# Patient Record
Sex: FEMALE | Race: WHITE | Employment: FULL TIME | URBAN - METROPOLITAN AREA
[De-identification: names, ages, dates, MRNs, and addresses within clinical notes are randomized per-mention and may not be internally consistent; named-entity substitution may affect disease eponyms.]

---

## 2017-07-03 ENCOUNTER — HOSPITAL ENCOUNTER (OUTPATIENT)
Dept: PHYSICAL THERAPY | Age: 62
Discharge: HOME | End: 2017-07-03
Payer: COMMERCIAL

## 2017-07-03 PROCEDURE — 97140 MANUAL THERAPY 1/> REGIONS: CPT

## 2017-07-03 PROCEDURE — 97162 PT EVAL MOD COMPLEX 30 MIN: CPT

## 2017-07-03 PROCEDURE — 97110 THERAPEUTIC EXERCISES: CPT

## 2017-07-03 NOTE — PROGRESS NOTES
[facility]  [address]  [place], [address]  [phone] fax    Plan of Care/ Statement of Necessity for Physical Therapy Services    Patient name: Julissa rOtiz Start of Care: 7/3/2017   Referral source: Umm Flanagan MD : 1955    Medical Diagnosis: Low back pain [M54.5]   Onset Date: Chronic (over 1 year)    Treatment Diagnosis: LBP   Prior Hospitalization: see medical history Provider#: [id number]   Medications: Verified on Patient summary List    Comorbidities: HTN, Arthritis    Prior Level of Function: The patient states improved ease of ambulation and sitting prior to onset. The Plan of Care and following information is based on the information from the initial evaluation. Assessment/ key information: The patient is a 60year old female with a chief complaint of LBP that limits her sitting and standing tolerance (currently 20 minutes). She states that she has had this pain for over a year but it has gradually gotten worse. She denies recent imaging. The patient has signs and symptoms consistent with SIJ involvement with associated LBP. She has impairments associated with the impairments consisting of pain, decreased ROM, decreased flexibility, limited sitting and standing tolerance, and decreased ADL efficiency. The patient will benefit from skilled PT in order to address the above impairments. Evaluation Complexity History MEDIUM  Complexity : 1-2 comorbidities / personal factors will impact the outcome/ POC ; Examination LOW Complexity : 1-2 Standardized tests and measures addressing body structure, function, activity limitation and / or participation in recreation  ;Presentation MEDIUM Complexity : Evolving with changing characteristics  ; Clinical Decision Making MEDIUM Complexity : FOTO score of 26-74  Overall Complexity Rating: MEDIUM  Problem List: pain affecting function, decrease ROM, decrease strength, decrease ADL/ functional abilitiies, decrease activity tolerance and decrease flexibility/ joint mobility   Treatment Plan may include any combination of the following: Therapeutic exercise, Therapeutic activities, Neuromuscular re-education, Physical agent/modality, Manual therapy and Patient education  Patient / Family readiness to learn indicated by: asking questions, trying to perform skills and interest  Persons(s) to be included in education: patient (P)  Barriers to Learning/Limitations: None  Patient Goal (s): Relief of some symptoms and possibly do some dry needling  Patient Self Reported Health Status: good  Rehabilitation Potential: good    Short Term Goals: To be accomplished in 2 weeks:   1. The patient will be independent and compliant with HEP to maximize therapeutic benefit. 2. The patient will improve negative RYAN to maximize ease of ADLs. Long Term Goals: To be accomplished in 4 weeks:   1. The patient will improve FOTO score to 63 to maximize quality of life. 2. The patient will improve hip ABD/EXT to 4/5 MMT to maximize stability during ADLs. 3. The patient will report being able to ambulate and sit for 30 minutes prior to pain increase for improved ADL ease. 4. The patient will improve display negative Marc Espinoza test to reduce stress on lumbar spine during ADLs. Frequency / Duration: Patient to be seen 2 times per  week for 4 weeks. Patient/ Caregiver education and instruction: Diagnosis, prognosis, self care, activity modification and exercises   [x]  Plan of care has been reviewed with SANDOVAL Kumar, PT 7/3/2017 5:56 PM    ________________________________________________________________________    I certify that the above Therapy Services are being furnished while the patient is under my care. I agree with the treatment plan and certify that this therapy is necessary.     [de-identified] Signature:____________________  Date:____________Time: _________    Please sign and return to [facility]  [address]  [place], [address]  [phone] fax

## 2017-07-03 NOTE — PROGRESS NOTES
PT DAILY TREATMENT NOTE     Patient Name: Eugene Grigsby  Date:7/3/2017  : 1955  [x]  Patient  Verified  Payor: Skip Sims / [facility] [place] [employer] / Product Type: Commerical /    In time:5:04  Out time:6:00  Total Treatment Time (min): 56  Visit #: 1 of 8    Treatment Area: Low back pain [M54.5]    SUBJECTIVE  Pain Level (0-10 scale): 3/10  Any medication changes, allergies to medications, adverse drug reactions, diagnosis change, or new procedure performed?: [x] No    [] Yes (see summary sheet for update)  Subjective functional status/changes:   [] No changes reported  The patient states that she has a chief complaint of LBP with L sided leg pain. OBJECTIVE  10 min Therapeutic Exercise:  [x] See flow sheet :   Rationale: increase ROM and increase strength to improve the patients ability to improve ADL ease. 8 min Manual Therapy:  L LE tx x 2 with leg pull following verbal permission, shotgun   Rationale: decrease pain, increase ROM and increase tissue extensibility to improve ADL ease. With   [] TE   [] TA   [] neuro   [] other: Patient Education: [x] Review HEP    [] Progressed/Changed HEP based on:   [] positioning   [] body mechanics   [] transfers   [] heat/ice application    [] other:      Other Objective/Functional Measures:   See IE     Pain Level (0-10 scale) post treatment: 3/10    ASSESSMENT/Changes in Function: See POC. Patient will continue to benefit from skilled PT services to modify and progress therapeutic interventions, address functional mobility deficits, address ROM deficits, address strength deficits, analyze and address soft tissue restrictions, analyze and cue movement patterns, analyze and modify body mechanics/ergonomics, assess and modify postural abnormalities and instruct in home and community integration to attain remaining goals.      [x]  See Plan of Care  []  See progress note/recertification  []  See Discharge Summary         Progress towards goals / Updated goals:  Short Term Goals: To be accomplished in 2 weeks:   1. The patient will be independent and compliant with HEP to maximize therapeutic benefit. 2. The patient will improve negative RYAN to maximize ease of ADLs. Long Term Goals: To be accomplished in 4 weeks:   1. The patient will improve FOTO score to 63 to maximize quality of life. 2. The patient will improve hip ABD/EXT to 4/5 MMT to maximize stability during ADLs. 3. The patient will report being able to ambulate and sit for 30 minutes prior to pain increase for improved ADL ease. 4. The patient will improve display negative [place] test to reduce stress on lumbar spine during ADLs.     PLAN  []  Upgrade activities as tolerated     [x]  Continue plan of care  []  Update interventions per flow sheet        []  Discharge due to:_  []  Other:_      Khadra Hopper, PT 7/3/2017  6:37 PM    Future Appointments  Date Time Provider Jen Cruz   7/11/2017 5:00 PM Ana Beams, PT [facility]   7/13/2017 5:30 PM Malissa Klein, PTA [facility]   7/18/2017 4:30 PM Ana Beams, PT [facility]   7/21/2017 4:30 PM Ana Beams, PT [facility]   7/24/2017 5:30 PM Ana Beams, PT [facility]   7/26/2017 4:00 PM Malissa Klein, PTA [facility]   7/31/2017 4:00 PM Ana Beams, PT [facility]

## 2017-07-11 ENCOUNTER — HOSPITAL ENCOUNTER (OUTPATIENT)
Dept: PHYSICAL THERAPY | Age: 62
Discharge: HOME | End: 2017-07-11
Payer: COMMERCIAL

## 2017-07-11 PROCEDURE — 97140 MANUAL THERAPY 1/> REGIONS: CPT

## 2017-07-11 PROCEDURE — 97110 THERAPEUTIC EXERCISES: CPT

## 2017-07-11 PROCEDURE — 97112 NEUROMUSCULAR REEDUCATION: CPT

## 2017-07-11 NOTE — PROGRESS NOTES
PT DAILY TREATMENT NOTE 3-16    Patient Name: Andria Awan  Date:2017  : 1955  [x]  Patient  Verified  Payor: [place] [facility] Product Type: Commer[facility] /    In time:1700  Out time:  Total Treatment Time (min): 52  Visit #: 2 of 8    Treatment Area: Low back pain [M54.5]    SUBJECTIVE  Pain Level (0-10 scale): 5  Any medication changes, allergies to medications, adverse drug reactions, diagnosis change, or new procedure performed?: [x] No    [] Yes (see summary sheet for update)  Subjective functional status/changes:   [] No changes reported  Pt reports continued symptoms along her lower back and into L leg. OBJECTIVE     min []Eval                  []Re-Eval       10 min Therapeutic Exercise:  [x] See flow sheet :   Rationale: increase ROM and increase strength to improve the patients ability to perform ADLs    25 min Neuromuscular Re-education:  [x]  See flow sheet :   Rationale: increase strength, improve coordination and increase proprioception  to improve the patients ability to improve stability     12 min Manual Therapy:  R ant/L post SIJ with MET and shot gun, L leg pull, MFR along L glutes, see below for DN   Rationale: decrease pain, increase ROM, increase tissue extensibility and decrease trigger points to improve stability    Dry Needling Procedure Note    Procedure: An intramuscular manual therapy (dry needling) and a neuro-muscular re-education treatment was done to deactivate myofascial trigger points with a 30 gauge filament needle under aseptic technique.     Indications:  [x] Myofascial pain and dysfunction [] Muscled spasms  [] Myalgia/myositis   [] Muscle cramps  [x] Muscle imbalances  [] Other:    Chart reviewed for the following:  Eunice MENDOZA, PT, have reviewed the medical history, summary list and precautions/contraindications for Andria Awan.  TIME OUT performed immediately prior to start of procedure:  Eunice MENDOZA PT, have performed the following reviews on Andria Awan prior to the start of the session:      [x] Verified patient identification by name and date of birth    [x] Agreement on all muscles being treated was verified   [x] Purpose of dry needling, side effects, possible complications, risks and benefits were explained to the patient   [x] Procedure site(s) verified  [x] Patient was positioned for comfort and draped for privacy  [x] Informed Consent was signed (initial visit) and verified verbally (subsequent visits)  [x] Patient was instructed on the need to report the use of blood thinners and/or immunosuppressant medications  [x] How to respond to possible adverse effects of treatment  [x] Self treatment of post needling soreness: ice, heat (moist heat, heat wraps) and stretching  [x] Opportunity was given to ask any questions, all questions were answered            Time: 7629  Date of procedure: 7/11/2017    Treatment: The following muscles were treated today with intramuscular dry needling  [] Left [] Right Abdominals: Ant Rectus Abdominis  [] Left [] Right External Oblique / Internal Oblique / Transverse Abdominis  [] Left [] Right Thoracic Multifidi / Benton Pancoast  [] Left [] Right Iliocostalis Nancy Elizabeth / Swati Burner  [] Left [] Right Longissimus Nancy Elizabeth / Swati Burner  [] Left [] Right Lumbar Multifidi  [] Left [] Right Serratus Posterior Inferior  [] Left [] Right Quadratus Lumborum  [] Left [] Right Psoas  [] Left [] Right Iliacus  [] Left [] Right Iliopsoas (inguinal)  [] Left [] Right Piriformis  [] Left [] Right Quadratus Femoris  [x] Left [] Right Emilio Messer / Ezequiel Wu / Hina Ferrer  [] Left [] Right Obturator Internus  [] Left [] Right Obturator Externus / Marcellina Soho / Inferior Gemellus    [] Left [] Right Tensor Fasciae Kallie  [] Left [] Right Iliotibial Band  [] Left [] Right Pectineus  [] Left [] Right Sartorius  [] Left [] Right Gracilis  [] Left [] Right Adductor Brevis / Adductor Longus / Adductor Merlinda Baller  [] Left [] Right Rectus Femoris / Vastus Lateralis / Vastus Intermedius / Vastus Medialis Obliquus  [] Left [] Right Tibialis Anterior / Posterior  [] Left [] Right Extensor Digitorum Longus / Brevis  [] Left [] Right Extensor Hallucis Longus / Brevis  [] Left [] Right Hamstrings: Biceps Femoris / Darwin Caro / Semimembranosis  [] Left [] Right Popliteus / Planteris  [] Left [] Right Gastrocnemius Medial / Lateral  [] Left [] Right Soleus  [] Left [] Right Peronei: Geradine Gubler / Shaune Essex / Tertius  [] Left [] Right Flexor Digitorum Longus / Brevis  [] Left [] Right Flexor Hallucis Longus / Brevis  [] Left [] Right Quadratus Plantae  [] Left [] Right Abductor Digiti Minimi  [] Left [] Right Foot Interossei  [] Left [] Right Other:    Patient's response to today's treatment:  [] Latent Twitch Response  [] Muscle relaxation [] Pain Relief  [] Post needling soreness [] without complications  [] Increased Range of Motion  [] Other:     Performed by: Rose Mariano, PT               With   [] TE   [] TA   [] neuro   [] other: Patient Education: [x] Review HEP    [] Progressed/Changed HEP based on:   [] positioning   [] body mechanics   [] transfers   [] heat/ice application    [] other:      Other Objective/Functional Measures:   HEP met     Pain Level (0-10 scale) post treatment: 0    ASSESSMENT/Changes in Function:   Pt reports abolished symptoms after DN to L glutes. Reviewed importance of rib movement and core engagement, with lessening of lumbar lordosis with cueing (she was able to do better with this after instruction). Also reviewed use of lacrosse ball for self MFR.     Patient will continue to benefit from skilled PT services to modify and progress therapeutic interventions, address functional mobility deficits, address ROM deficits, address strength deficits, analyze and address soft tissue restrictions, analyze and cue movement patterns, analyze and modify body mechanics/ergonomics, assess and modify postural abnormalities and instruct in home and community integration to attain remaining goals. []  See Plan of Care  []  See progress note/recertification  []  See Discharge Summary         Progress towards goals / Updated goals:  Short Term Goals: To be accomplished in 2 weeks:                        1. The patient will be independent and compliant with HEP to maximize therapeutic benefit. met 7/11/2017                        2. The patient will improve negative RYAN to maximize ease of ADLs. Long Term Goals: To be accomplished in 4 weeks:                        1. The patient will improve FOTO score to 63 to maximize quality of life. 2. The patient will improve hip ABD/EXT to 4/5 MMT to maximize stability during ADLs. 3. The patient will report being able to ambulate and sit for 30 minutes prior to pain increase for improved ADL ease. 4. The patient will improve display negative Tameka Habermann test to reduce stress on lumbar spine during ADLs.     PLAN  [x]  Upgrade activities as tolerated     [x]  Continue plan of care  [x]  Update interventions per flow sheet       []  Discharge due to:_  []  Other:_      Latha Jerry PT 7/11/2017  5:05 PM    Future Appointments  Date Time Provider Jen Cruz   7/13/2017 5:30 PM Susana Bender, PTA [facility]   7/18/2017 4:30 PM Latha Jerry, PT [facility]   7/21/2017 4:30 PM Latha Jerry, PT [facility]   7/24/2017 5:30 PM Latha Jerry, PT [facility]   7/26/2017 4:00 PM Susana Peak, PTA [facility]   7/31/2017 4:00 PM Latha Jerry, PT [facility]

## 2017-07-13 ENCOUNTER — HOSPITAL ENCOUNTER (OUTPATIENT)
Dept: PHYSICAL THERAPY | Age: 62
Discharge: HOME | End: 2017-07-13
Payer: COMMERCIAL

## 2017-07-13 PROCEDURE — 97140 MANUAL THERAPY 1/> REGIONS: CPT

## 2017-07-13 PROCEDURE — 97110 THERAPEUTIC EXERCISES: CPT

## 2017-07-13 PROCEDURE — 97112 NEUROMUSCULAR REEDUCATION: CPT

## 2017-07-13 NOTE — PROGRESS NOTES
PT DAILY TREATMENT NOTE     Patient Name: Ramond Pallas  Date:2017  : 1955  [x]  Patient  Verified  Payor: [place] / [facility] [place] [employer] / Product Type: Commerical /    In time:5:30  Out time:6:03  Total Treatment Time (min): 33  Visit #: 3 of 8    Treatment Area: Low back pain [M54.5]    SUBJECTIVE  Pain Level (0-10 scale): 4/10  Any medication changes, allergies to medications, adverse drug reactions, diagnosis change, or new procedure performed?: [x] No    [] Yes (see summary sheet for update)  Subjective functional status/changes:   [] No changes reported  Pt reports decreased pain today. Pt reports increased pain the day after her dry needling treatment but today she has decreased pain. Pt reports having a fall at work a year ago. Pt reports falling flat on her back with her (R) leg behind her and subsequently having a (R) ankle sprain. OBJECTIVE    15 min Therapeutic Exercise:  [x] See flow sheet :   Rationale: increase ROM and increase strength to improve the patients ability to tolerate ADLs. 10 min Neuromuscular Re-education:  [x]  See flow sheet :   Rationale: increase strength, improve coordination and increase proprioception  to improve the patients ability to perform functional activities. 8 min Manual Therapy:  TPR to (B) glutes   Rationale: decrease pain, increase ROM and increase tissue extensibility to improve tolerance to ADLs. With   [] TE   [] TA   [] neuro   [] other: Patient Education: [x] Review HEP    [] Progressed/Changed HEP based on:   [] positioning   [] body mechanics   [] transfers   [] heat/ice application    [] other:      Other Objective/Functional Measures: Neutral pelvic alignment. Pain Level (0-10 scale) post treatment: 0/10    ASSESSMENT/Changes in Function: Pt demonstrates fair control with QP exercises requiring tactile cues to correct form and avoid trunk rotation and extension.      Patient will continue to benefit from skilled PT services to modify and progress therapeutic interventions, address functional mobility deficits, address ROM deficits, address strength deficits, analyze and address soft tissue restrictions, analyze and cue movement patterns and analyze and modify body mechanics/ergonomics to attain remaining goals. []  See Plan of Care  []  See progress note/recertification  []  See Discharge Summary         Progress towards goals / Updated goals:  Short Term Goals: To be accomplished in 2 weeks:                        4. The patient will be independent and compliant with HEP to maximize therapeutic benefit. met 7/11/2017                        2. The patient will improve negative RYAN to maximize ease of ADLs. Long Term Goals: To be accomplished in 4 weeks:                        4. The patient will improve FOTO score to 63 to maximize quality of life.                        2. The patient will improve hip ABD/EXT to 4/5 MMT to maximize stability during ADLs.                       5. The patient will report being able to ambulate and sit for 30 minutes prior to pain increase for improved ADL ease.                         4. The patient will improve display negative Cecilia Sarwat test to reduce stress on lumbar spine during ADLs.     PLAN  []  Upgrade activities as tolerated     [x]  Continue plan of care  []  Update interventions per flow sheet       []  Discharge due to:_  []  Other:_      Kourtney Lindo PTA 7/13/2017  5:44 PM    Future Appointments  Date Time Provider Jen Cruz   7/18/2017 4:30 PM [facility]   7/21/2017 4:30 PM [facility] North[facility]   7/24/2017 5:30 PM [facility]   7/26/2017 4:00 PM [facility] Lindo [facility]   7/31/2017 4:00 PM [facility]

## 2017-07-18 ENCOUNTER — HOSPITAL ENCOUNTER (OUTPATIENT)
Dept: PHYSICAL THERAPY | Age: 62
Discharge: HOME | End: 2017-07-18
Payer: COMMERCIAL

## 2017-07-18 PROCEDURE — 97110 THERAPEUTIC EXERCISES: CPT

## 2017-07-18 PROCEDURE — 97112 NEUROMUSCULAR REEDUCATION: CPT

## 2017-07-18 NOTE — PROGRESS NOTES
PT DAILY TREATMENT NOTE 3-16    Patient Name: Ni Montgomery  Date:2017  : 1955  [x]  Patient  Verified  Payor: Barbee Mcardle / [facility] / Product Type: Commerical /    In time:1630  Out time:1708  Total Treatment Time (min): 38  Visit #: 4 of 8    Treatment Area: Low back pain [M54.5]    SUBJECTIVE  Pain Level (0-10 scale): 3  Any medication changes, allergies to medications, adverse drug reactions, diagnosis change, or new procedure performed?: [x] No    [] Yes (see summary sheet for update)  Subjective functional status/changes:   [] No changes reported  Pt reports she is feeling much better overall, though she was super tender the day after DN. OBJECTIVE     min []Eval                  []Re-Eval     10 min Therapeutic Exercise:  [x] See flow sheet :   Rationale: increase ROM and increase strength to improve the patients ability to perform ADLs    28 min Neuromuscular Re-education:  [x]  See flow sheet : see below for DN   Rationale: increase strength, improve coordination and increase proprioception  to improve the patients ability to improve stability     Dry Needling Procedure Note    Procedure: An intramuscular manual therapy (dry needling) and a neuro-muscular re-education treatment was done to deactivate myofascial trigger points with a 30 gauge filament needle under aseptic technique.     Indications:  [x] Myofascial pain and dysfunction [] Muscled spasms  [] Myalgia/myositis   [] Muscle cramps  [x] Muscle imbalances  [] Other:    Chart reviewed for the following:  Haylee MENDOZA PT, have reviewed the medical history, summary list and precautions/contraindications for Ni Montgomery.  TIME OUT performed immediately prior to start of procedure:  Haylee MENDOZA PT, have performed the following reviews on Ni Montgomery prior to the start of the session:      [x] Verified patient identification by name and date of birth    [x] Agreement on all muscles being treated was verified [x] Purpose of dry needling, side effects, possible complications, risks and benefits were explained to the patient   [x] Procedure site(s) verified  [x] Patient was positioned for comfort and draped for privacy  [x] Informed Consent was signed (initial visit) and verified verbally (subsequent visits)  [x] Patient was instructed on the need to report the use of blood thinners and/or immunosuppressant medications  [x] How to respond to possible adverse effects of treatment  [x] Self treatment of post needling soreness: ice, heat (moist heat, heat wraps) and stretching  [x] Opportunity was given to ask any questions, all questions were answered            Time: 1655  Date of procedure: 7/18/2017    Treatment: The following muscles were treated today with intramuscular dry needling  [] Left [] Right Abdominals: Ant Rectus Abdominis  [] Left [] Right External Oblique / Internal Oblique / Transverse Abdominis  [] Left [] Right Thoracic Multifidi / Eldonna Kerbs  [] Left [] Right Iliocostalis Floresita Anton / [place] Esequiel  [] Left [] Right Longissimus Floresita Anton / [place] Esequiel  [] Left [] Right Lumbar Multifidi  [] Left [] Right Serratus Posterior Inferior  [] Left [] Right Quadratus Lumborum  [] Left [] Right Psoas  [] Left [] Right Iliacus  [] Left [] Right Iliopsoas (inguinal)  [] Left [] Right Piriformis  [x] Left [] Right Quadratus Femoris  [x] Left [] Right Argelia Dry / Shoaib Najjar / Faustino Zaman  [] Left [] Right Obturator Internus  [x] Left [] Right Obturator Externus / Brenda Ek / Inferior Gemellus    [] Left [] Right Tensor Fasciae Kallie  [] Left [] Right Iliotibial Band  [] Left [] Right Pectineus  [] Left [] Right Sartorius  [] Left [] Right Gracilis  [] Left [] Right Adductor Brevis / Adductor Longus / Dave Fischer  [] Left [] Right Rectus Femoris / Vastus Lateralis / Vastus Intermedius / Vastus Medialis Obliquus  [] Left [] Right Tibialis Anterior / Posterior  [] Left [] Right Extensor Digitorum Longus / Brevis  [] Left [] Right Extensor Hallucis Longus / Brevis  [x] Left [] Right Hamstrings: Biceps Femoris / Ree Mis / Semimembranosis  [] Left [] Right Popliteus / Planteris  [] Left [] Right Gastrocnemius Medial / Lateral  [] Left [] Right Soleus  [] Left [] Right Peronei: Terrial Clamp / Von Neat / Tertius  [] Left [] Right Flexor Digitorum Longus / Brevis  [] Left [] Right Flexor Hallucis Longus / Brevis  [] Left [] Right Quadratus Plantae  [] Left [] Right Abductor Digiti Minimi  [] Left [] Right Foot Interossei  [] Left [] Right Other:    Patient's response to today's treatment:  [x] Latent Twitch Response  [x] Muscle relaxation [x] Pain Relief  [x] Post needling soreness [x] without complications  [] Increased Range of Motion  [] Other:     Performed by: Daysi Garvey, PT               With   [] TE   [] TA   [] neuro   [] other: Patient Education: [x] Review HEP    [] Progressed/Changed HEP based on:   [] positioning   [] body mechanics   [] transfers   [] heat/ice application    [] other:      Other Objective/Functional Measures:   R RYAN (-) SIJ, (+) flexibility  L RYAN (-) SIJ, (+) flexibility     Pain Level (0-10 scale) post treatment: 1    ASSESSMENT/Changes in Function:   Pt reports feeling more tender with DN today along L glutes/quad fem/HS insertion. She reports less pain along buttocks with sitting, but will re-evaluate for possible obturator internus next week as needed. (-) YOSEF DAVIS for SIJ provocation. Will progress toward more strengthening, especially in standing. Patient will continue to benefit from skilled PT services to modify and progress therapeutic interventions, address functional mobility deficits, address ROM deficits, address strength deficits, analyze and address soft tissue restrictions, analyze and cue movement patterns, analyze and modify body mechanics/ergonomics, assess and modify postural abnormalities and instruct in home and community integration to attain remaining goals.      []  See Plan of Care  []  See progress note/recertification  []  See Discharge Summary         Progress towards goals / Updated goals:  Short Term Goals: To be accomplished in 2 weeks:                        6. The patient will be independent and compliant with HEP to maximize therapeutic benefit. met 7/11/2017                        2. The patient will improve negative RYAN to maximize ease of ADLs. met 7/18/2017  Long Term Goals: To be accomplished in 4 weeks:                        8. The patient will improve FOTO score to 63 to maximize quality of life.                        2. The patient will improve hip ABD/EXT to 4/5 MMT to maximize stability during ADLs.                       0. The patient will report being able to ambulate and sit for 30 minutes prior to pain increase for improved ADL ease.                         4. The patient will improve display negative Clydell Al test to reduce stress on lumbar spine during ADLs.     PLAN  [x]  Upgrade activities as tolerated     [x]  Continue plan of care  [x]  Update interventions per flow sheet       []  Discharge due to:_  []  Other:_      Ethan Bamberger, PT 7/18/2017  4:50 PM    Future Appointments  Date Time Provider Jen Cruz   7/21/2017 4:30 PM [facility]   7/24/2017 5:30 PM [facility]   7/26/2017 4:00 PM [facility]   7/31/2017 4:00 PM [facility]

## 2017-07-21 ENCOUNTER — HOSPITAL ENCOUNTER (OUTPATIENT)
Dept: PHYSICAL THERAPY | Age: 62
Discharge: HOME | End: 2017-07-21
Payer: COMMERCIAL

## 2017-07-21 PROCEDURE — 97112 NEUROMUSCULAR REEDUCATION: CPT

## 2017-07-21 PROCEDURE — 97140 MANUAL THERAPY 1/> REGIONS: CPT

## 2017-07-21 NOTE — PROGRESS NOTES
PT DAILY TREATMENT NOTE 3-16    Patient Name: Jonny Rivera  Date:2017  : 1955  [x]  Patient  Verified  Payor: Nanette Skelton / [place]: [facility] / Product Type: Commerical /    In time:1630  Out time:1702  Total Treatment Time (min): 32  Visit #: 5 of 8    Treatment Area: Low back pain [M54.5]    SUBJECTIVE  Pain Level (0-10 scale): 2  Any medication changes, allergies to medications, adverse drug reactions, diagnosis change, or new procedure performed?: [x] No    [] Yes (see summary sheet for update)  Subjective functional status/changes:   [] No changes reported  Pt reports she hurt at work the whole day after DN; today she had a sharp pain in her L lower back. OBJECTIVE     min []Eval                  []Re-Eval       24 min Neuromuscular Re-education:  [x]  See flow sheet :   Rationale: increase strength, improve coordination and increase proprioception  to improve the patients ability to improve stability    8 min Manual Therapy:  R ant/L post SIJ with MET and shotgun; L upslip correction   Rationale: decrease pain, increase ROM and increase tissue extensibility to improve pelvic stability              With   [] TE   [] TA   [] neuro   [] other: Patient Education: [x] Review HEP    [] Progressed/Changed HEP based on:   [] positioning   [] body mechanics   [] transfers   [] heat/ice application    [] other:      Other Objective/Functional Measures:   Requires cues to slow down with QP exercises     Pain Level (0-10 scale) post treatment: 0    ASSESSMENT/Changes in Function:   Pt demonstrates better core engagement in supine and standing. Discussed s/l QL stretch for home to improve L upslip. Added standing TA hipx3, with pt requiring a cue for rib position, but able to maintain once placed in position.     Patient will continue to benefit from skilled PT services to modify and progress therapeutic interventions, address functional mobility deficits, address ROM deficits, address strength deficits, analyze and address soft tissue restrictions, analyze and cue movement patterns, analyze and modify body mechanics/ergonomics, assess and modify postural abnormalities and instruct in home and community integration to attain remaining goals. []  See Plan of Care  []  See progress note/recertification  []  See Discharge Summary         Progress towards goals / Updated goals:  Short Term Goals: To be accomplished in 2 weeks:                        7. The patient will be independent and compliant with HEP to maximize therapeutic benefit. met 7/11/2017                        2. The patient will improve negative RYAN to maximize ease of ADLs. met 7/18/2017  Long Term Goals: To be accomplished in 4 weeks:                        3. The patient will improve FOTO score to 63 to maximize quality of life.                        2. The patient will improve hip ABD/EXT to 4/5 MMT to maximize stability during ADLs.                       9. The patient will report being able to ambulate and sit for 30 minutes prior to pain increase for improved ADL ease. Not met 7/21/2017                        4. The patient will improve display negative Samantha Certain test to reduce stress on lumbar spine during ADLs.     PLAN  [x]  Upgrade activities as tolerated     [x]  Continue plan of care  [x]  Update interventions per flow sheet       []  Discharge due to:_  []  Other:_      Marcos Lowery, PT 7/21/2017  4:48 PM    Future Appointments  Date Time Provider Jen Cruz   7/24/2017 5:30 PM Marcos Lowery, PT [facility]   7/26/2017 4:00 PM Fazal Valentin, [facility]   7/31/2017 4:00 PM Marcos Lowery, PT [facility]

## 2017-07-24 ENCOUNTER — HOSPITAL ENCOUNTER (OUTPATIENT)
Dept: PHYSICAL THERAPY | Age: 62
Discharge: HOME | End: 2017-07-24
Payer: COMMERCIAL

## 2017-07-24 PROCEDURE — 97112 NEUROMUSCULAR REEDUCATION: CPT

## 2017-07-24 PROCEDURE — 97140 MANUAL THERAPY 1/> REGIONS: CPT

## 2017-07-24 PROCEDURE — 97110 THERAPEUTIC EXERCISES: CPT

## 2017-07-24 NOTE — PROGRESS NOTES
PT DAILY TREATMENT NOTE 3-16    Patient Name: Chantelle Perez  Date:2017  : 1955  [x]  Patient  Verified  Payor: Landen Sanabria / [place]: [facility] [employer] PT / Product Type: Commerical /    In time:0  Out time:181  Total Treatment Time (min): 40  Visit #: 6 of 8    Treatment Area: Low back pain [M54.5]    SUBJECTIVE  Pain Level (0-10 scale): 2  Any medication changes, allergies to medications, adverse drug reactions, diagnosis change, or new procedure performed?: [x] No    [] Yes (see summary sheet for update)  Subjective functional status/changes:   [] No changes reported  Pt reports not having pain until about 3:30 p.m., and she better understands how to engage her core with work activities. OBJECTIVE     min []Eval                  []Re-Eval     8 min Therapeutic Exercise:  [x] See flow sheet :   Rationale: increase ROM and increase strength to improve the patients ability to perform ADLs    22 min Neuromuscular Re-education:  [x]  See flow sheet :   Rationale: increase strength, improve coordination and increase proprioception  to improve the patients ability to improve stability     10 min Manual Therapy:  R ant/L post SIJ with MET and shotgun; L leg pull; see below for DN   Rationale: decrease pain, increase ROM, increase tissue extensibility and decrease trigger points to improve stability    Dry Needling Procedure Note    Procedure: An intramuscular manual therapy (dry needling) and a neuro-muscular re-education treatment was done to deactivate myofascial trigger points with a 30 gauge filament needle under aseptic technique.     Indications:  [x] Myofascial pain and dysfunction [] Muscled spasms  [] Myalgia/myositis   [] Muscle cramps  [x] Muscle imbalances  [] Other:    Chart reviewed for the following:  Brenden MENDOZA, PT, have reviewed the medical history, summary list and precautions/contraindications for Chantelle Perez.  TIME OUT performed immediately prior to start of procedure:  Johan Evans Ilya Light PT, have performed the following reviews on Cristela Grewal prior to the start of the session:      [x] Verified patient identification by name and date of birth    [x] Agreement on all muscles being treated was verified   [x] Purpose of dry needling, side effects, possible complications, risks and benefits were explained to the patient   [x] Procedure site(s) verified  [x] Patient was positioned for comfort and draped for privacy  [x] Informed Consent was signed (initial visit) and verified verbally (subsequent visits)  [x] Patient was instructed on the need to report the use of blood thinners and/or immunosuppressant medications  [x] How to respond to possible adverse effects of treatment  [x] Self treatment of post needling soreness: ice, heat (moist heat, heat wraps) and stretching  [x] Opportunity was given to ask any questions, all questions were answered            Time: 1802  Date of procedure: 7/24/2017    Treatment: The following muscles were treated today with intramuscular dry needling  [] Left [] Right Abdominals: Ant Rectus Abdominis  [] Left [] Right External Oblique / Internal Oblique / Transverse Abdominis  [] Left [] Right Thoracic Multifidi / Rotatores  [x] Left [x] Right Iliocostalis Thoracis / Thuy Jann  [x] Left [x] Right Longissimus Thoracis / Lumborum  [x] Left [x] Right Lumbar Multifidi  [] Left [] Right Serratus Posterior Inferior  [] Left [] Right Quadratus Lumborum  [] Left [] Right Psoas  [] Left [] Right Iliacus  [] Left [] Right Iliopsoas (inguinal)  [] Left [] Right Piriformis  [] Left [] Right Quadratus Femoris  [] Left [] Right Laura Postal / Selina Merles / Ardean Loop  [] Left [] Right Obturator Internus  [] Left [] Right Obturator Externus / Vonna Hopper / Inferior Gemellus    [] Left [] Right Tensor Fasciae Kallie  [] Left [] Right Iliotibial Band  [] Left [] Right Pectineus  [] Left [] Right Sartorius  [] Left [] Right Gracilis  [] Left [] Right Adductor Brevis / Adductor Longus / Liliana Fleeting  [] Left [] Right Rectus Femoris / Vastus Lateralis / Vastus Intermedius / Vastus Medialis Obliquus  [] Left [] Right Tibialis Anterior / Posterior  [] Left [] Right Extensor Digitorum Longus / Brevis  [] Left [] Right Extensor Hallucis Longus / Brevis  [] Left [] Right Hamstrings: Biceps Femoris / Judith Been / Semimembranosis  [] Left [] Right Popliteus / Planteris  [] Left [] Right Gastrocnemius Medial / Lateral  [] Left [] Right Soleus  [] Left [] Right Peronei: Vallie Bowels / Judithann Needs / Tertius  [] Left [] Right Flexor Digitorum Longus / Brevis  [] Left [] Right Flexor Hallucis Longus / Brevis  [] Left [] Right Quadratus Plantae  [] Left [] Right Abductor Digiti Minimi  [] Left [] Right Foot Interossei  [] Left [] Right Other:    Patient's response to today's treatment:  [x] Latent Twitch Response  [x] Muscle relaxation [x] Pain Relief  [x] Post needling soreness [x] without complications  [] Increased Range of Motion  [] Other:     Performed by: Mirta Phelps, PT                 With   [] TE   [] TA   [] neuro   [] other: Patient Education: [x] Review HEP    [] Progressed/Changed HEP based on:   [] positioning   [] body mechanics   [] transfers   [] heat/ice application    [] other:      Other Objective/Functional Measures: less instability in QP     Pain Level (0-10 scale) post treatment: 0    ASSESSMENT/Changes in Function:   Pt reports better management of symptoms and noted improved core stability with exercises. DN along lumbar paraspinals, with pt reporting relief of symptoms post.  Will progress core exercises as tolerated NV.     Patient will continue to benefit from skilled PT services to modify and progress therapeutic interventions, address functional mobility deficits, address ROM deficits, address strength deficits, analyze and address soft tissue restrictions, analyze and cue movement patterns, analyze and modify body mechanics/ergonomics, assess and modify postural abnormalities and instruct in home and community integration to attain remaining goals. []  See Plan of Care  []  See progress note/recertification  []  See Discharge Summary         Progress towards goals / Updated goals:  Short Term Goals: To be accomplished in 2 weeks:                        1. The patient will be independent and compliant with HEP to maximize therapeutic benefit. met 7/11/2017                        2. The patient will improve negative RYAN to maximize ease of ADLs. met 7/18/2017  Long Term Goals: To be accomplished in 4 weeks:                        1. The patient will improve FOTO score to 63 to maximize quality of life.                        2. The patient will improve hip ABD/EXT to 4/5 MMT to maximize stability during ADLs.                       5. The patient will report being able to ambulate and sit for 30 minutes prior to pain increase for improved ADL ease. Not met 7/21/2017                        4. The patient will improve display negative Clydell Al test to reduce stress on lumbar spine during ADLs.     PLAN  [x]  Upgrade activities as tolerated     [x]  Continue plan of care  [x]  Update interventions per flow sheet       []  Discharge due to:_  []  Other:_      Ethan Bamberger, ADITI 7/24/2017  5:33 PM    Future Appointments  Date Time Provider Jen Cruz   7[facility]26[facility]2017 4:00 PM [facility]   7/31/2017 4:00 PM Ethan Bamberger, PT [facility]

## 2017-07-26 ENCOUNTER — HOSPITAL ENCOUNTER (OUTPATIENT)
Dept: PHYSICAL THERAPY | Age: 62
Discharge: HOME | End: 2017-07-26
Payer: COMMERCIAL

## 2017-07-26 PROCEDURE — 97140 MANUAL THERAPY 1/> REGIONS: CPT

## 2017-07-26 PROCEDURE — 97110 THERAPEUTIC EXERCISES: CPT

## 2017-07-26 PROCEDURE — 97112 NEUROMUSCULAR REEDUCATION: CPT

## 2017-07-26 NOTE — PROGRESS NOTES
PT DAILY TREATMENT NOTE     Patient Name: Ciara Hutson  Date:2017  : 1955  [x]  Patient  Verified  Payor: Sheila Aguilar / Plan: [facility] PT / Product Type: Commerical /    In time:4:00  Out time:4:38  Total Treatment Time (min): 38  Visit #: 7 of 8    Treatment Area: Low back pain [M54.5]    SUBJECTIVE  Pain Level (0-10 scale): 1/10  Any medication changes, allergies to medications, adverse drug reactions, diagnosis change, or new procedure performed?: [x] No    [] Yes (see summary sheet for update)  Subjective functional status/changes:   [] No changes reported  \"I've been feeling a whole lot better. \"    OBJECTIVE    10 min Therapeutic Exercise:  [x] See flow sheet :   Rationale: increase ROM and increase strength to improve the patients ability to perform ADL's. 20 min Neuromuscular Re-education:  [x]  See flow sheet :   Rationale: increase strength and increase proprioception  to improve the patients ability to perform functional activities. 8 min Manual Therapy:  Level SI alignment. DTM/TPR (L) QL, L/S paraspinals and piriformis. Rationale: decrease pain, increase ROM, increase tissue extensibility and decrease trigger points to improve activity tolerance. With   [x] TE   [] TA   [] neuro   [] other: Patient Education: [x] Review HEP    [] Progressed/Changed HEP based on:   [] positioning   [] body mechanics   [] transfers   [] heat/ice application    [] other:      Other Objective/Functional Measures: Poor glute strength (B). (-) Ely test (B). Altered bridges to have toes up 12x. Cueing to correct rib flare, corrects but has difficulty maintaining. Pain Level (0-10 scale) post treatment: 0/10    ASSESSMENT/Changes in Function: FOTO 89%.     Patient will continue to benefit from skilled PT services to modify and progress therapeutic interventions, address functional mobility deficits, address ROM deficits, address strength deficits, analyze and address soft tissue restrictions and analyze and modify body mechanics/ergonomics to attain remaining goals. [x]  See Plan of Care  []  See progress note/recertification  []  See Discharge Summary         Progress towards goals / Updated goals:  Short Term Goals: To be accomplished in 2 weeks:                        8. The patient will be independent and compliant with HEP to maximize therapeutic benefit. met 7/11/2017                        2. The patient will improve negative RYAN to maximize ease of ADLs. met 7/18/2017  Long Term Goals: To be accomplished in 4 weeks:                        5. The patient will improve FOTO score to 63 to maximize quality of life. - FOTO 89%. 7/26/2017                        2. The patient will improve hip ABD/EXT to 4/5 MMT to maximize stability during ADLs.                       4. The patient will report being able to ambulate and sit for 30 minutes prior to pain increase for improved ADL ease.  Not met 7/21/2017                        4. The patient will improve display negative Cecilia Sarwat test to reduce stress on lumbar spine during ADLs.  - Met, (-) Ely test (B). 7/26/2017    PLAN  []  Upgrade activities as tolerated     [x]  Continue plan of care  []  Update interventions per flow sheet       []  Discharge due to:_  []  Other:_      Melissa Orta, SANDOVAL 7/26/2017  4:01 PM    Future Appointments  Date Time Provider Jen Cruz   7/31/2017 4:00 PM Ricardo North PT [id number]

## 2017-07-31 ENCOUNTER — HOSPITAL ENCOUNTER (OUTPATIENT)
Dept: PHYSICAL THERAPY | Age: 62
Discharge: HOME | End: 2017-07-31
Payer: COMMERCIAL

## 2017-07-31 PROCEDURE — 97110 THERAPEUTIC EXERCISES: CPT

## 2017-07-31 PROCEDURE — 97112 NEUROMUSCULAR REEDUCATION: CPT

## 2017-07-31 NOTE — PROGRESS NOTES
PT DAILY TREATMENT NOTE 3-16    Patient Name: Jeannine Kim  Date:2017  : 1955  [x]  Patient  Verified  Payor: Jessica Osman / Plan[facility] [place] [employer]  [facility] Product Type: Commerical /    In time:1601  Out time:1633  Total Treatment Time (min): 32  Visit #: 8 of 8    Treatment Area: Low back pain [M54.5]    SUBJECTIVE  Pain Level (0-10 scale): 0  Any medication changes, allergies to medications, adverse drug reactions, diagnosis change, or new procedure performed?: [x] No    [] Yes (see summary sheet for update)  Subjective functional status/changes:   [] No changes reported  \"I'm really doing good, I'm doing the exercises at home. The only time I get pain down my leg is if I sit for a long long period of time [45 minutes]. \"    OBJECTIVE     min []Eval                  []Re-Eval     8 min Therapeutic Exercise:  [x] See flow sheet :   Rationale: increase ROM and increase strength to improve the patients ability to perform ADLs    24 min Neuromuscular Re-education:  [x]  See flow sheet :   Rationale: increase strength, improve coordination and increase proprioception  to improve the patients ability to improve stability           With   [] TE   [] TA   [] neuro   [] other: Patient Education: [x] Review HEP    [] Progressed/Changed HEP based on:   [] positioning   [] body mechanics   [] transfers   [] heat/ice application    [] other:      Other Objective/Functional Measures:   FOTO - 89%  HEP - compliant  RYAN - B (-)  Ely's - B (-)  R hip ext MMT 4/5  R hip ABD MMT 5/5  L hip ext MMT 5/5  L hip ABD MMT 4/5     Pain Level (0-10 scale) post treatment: 0    ASSESSMENT/Changes in Function:   Pt demonstrates improvement with PT, with FOTO at 89%, B RYAN and Ely's (-), R hip ext MMT 4/5 and L hip ext MMT 5/5, R hip ABD MMT 5/5 and L hip ABD MMT 4/5.   Pt reports ability to self manage symptoms and is prepared for d/c/  Thank you for this referral.     []  See Plan of Care  []  See progress note/recertification  [x]  See Discharge Summary         Progress towards goals / Updated goals:  Short Term Goals: To be accomplished in 2 weeks:                        3. The patient will be independent and compliant with HEP to maximize therapeutic benefit. met 7/11/2017                        2. The patient will improve negative RYAN to maximize ease of ADLs. met 7/18/2017  Long Term Goals: To be accomplished in 4 weeks:                        5. The patient will improve FOTO score to 63 to maximize quality of life. - FOTO 89%. 7/26/2017                        2. The patient will improve hip ABD/EXT to 4/5 MMT to maximize stability during ADLs. met 7/31/2017                      3. The patient will report being able to ambulate and sit for 30 minutes prior to pain increase for improved ADL ease.  Not met 7/21/2017; met 7/31/2017                        4. The patient will improve display negative Blake Michael test to reduce stress on lumbar spine during ADLs. - Met, (-) Ely test (B). 7/26/2017    PLAN  []  Upgrade activities as tolerated     []  Continue plan of care  []  Update interventions per flow sheet       [x]  Discharge due to completion of program  []  Other:_      Cheryl Lakhani, PT 7/31/2017  4:14 PM    No future appointments.

## 2017-07-31 NOTE — PROGRESS NOTES
[facility]  [address]  [place], [address]  [phone] fax    Physical Therapy Discharge Summary  Patient name: Anita Chan Start of Care: 7/3/2017   Referral source: Barbara Crespo MD : 1955                          Medical Diagnosis: Low back pain [M54.5] Onset Date: Chronic (over 1 year)                          Treatment Diagnosis: LBP   Prior Hospitalization: see medical history Provider#: [id number]   Medications: Verified on Patient summary List    Comorbidities: HTN, Arthritis    Prior Level of Function: The patient states improved ease of ambulation and sitting prior to onset. Visits from Start of Care: 8    Missed Visits: 0  Reporting Period : 2017 to 2017    Summary of Care:  Short Term Goals: To be accomplished in 2 weeks:                        1. The patient will be independent and compliant with HEP to maximize therapeutic benefit. met 2017                        2. The patient will improve negative RYAN to maximize ease of ADLs. met 2017  Long Term Goals: To be accomplished in 4 weeks:                        1. The patient will improve FOTO score to 63 to maximize quality of life. - FOTO 89%. 2017                        2. The patient will improve hip ABD/EXT to 4/5 MMT to maximize stability during ADLs. met 2017                      3. The patient will report being able to ambulate and sit for 30 minutes prior to pain increase for improved ADL ease.  Not met 2017; met 2017                        4. The patient will improve display negative [place] test to reduce stress on lumbar spine during ADLs. - Met, (-) Ely test (B). 2017    ASSESSMENT/RECOMMENDATIONS: Pt demonstrates improvement with PT, with FOTO at 89%, B RYAN and Ely's (-), R hip ext MMT 4/5 and L hip ext MMT 5/5, R hip ABD MMT 5/5 and L hip ABD MMT 4/5.   Pt reports ability to self manage symptoms and is prepared for d/c/  Thank you for this referral.  [x]Discontinue therapy: [x]Patient has reached or is progressing toward set goals      []Patient is non-compliant or has abdicated      []Due to lack of appreciable progress towards set goals    Heavenly Palacios, PT 7/31/2017 4:19 PM

## 2020-08-20 ENCOUNTER — HOSPITAL ENCOUNTER (OUTPATIENT)
Dept: LAB | Age: 65
Discharge: HOME | End: 2020-08-20

## 2020-08-20 LAB
CALCIUM SERPL-MCNC: 9.7 MG/DL (ref 8.5–10.1)
CALCIUM SERPL-MCNC: 9.7 MG/DL (ref 8.5–10.1)
PTH-INTACT SERPL-MCNC: 48.8 PG/ML (ref 18.4–88)
TSH SERPL DL<=0.05 MIU/L-ACNC: 1.66 UIU/ML (ref 0.36–3.74)

## 2020-08-20 PROCEDURE — 84439 ASSAY OF FREE THYROXINE: CPT

## 2020-08-20 PROCEDURE — 83970 ASSAY OF PARATHORMONE: CPT

## 2020-08-20 PROCEDURE — 84480 ASSAY TRIIODOTHYRONINE (T3): CPT

## 2020-08-20 PROCEDURE — 84443 ASSAY THYROID STIM HORMONE: CPT

## 2020-08-20 PROCEDURE — 82310 ASSAY OF CALCIUM: CPT

## 2020-08-20 PROCEDURE — 36415 COLL VENOUS BLD VENIPUNCTURE: CPT

## 2020-08-21 LAB — T3 SERPL-MCNC: 135 NG/DL (ref 71–180)
